# Patient Record
Sex: FEMALE | Race: WHITE | NOT HISPANIC OR LATINO | Employment: OTHER | URBAN - METROPOLITAN AREA
[De-identification: names, ages, dates, MRNs, and addresses within clinical notes are randomized per-mention and may not be internally consistent; named-entity substitution may affect disease eponyms.]

---

## 2024-05-02 ENCOUNTER — APPOINTMENT (OUTPATIENT)
Dept: ORTHOPEDIC SURGERY | Facility: CLINIC | Age: 74
End: 2024-05-02
Payer: MEDICARE

## 2024-05-09 ENCOUNTER — OFFICE VISIT (OUTPATIENT)
Dept: ORTHOPEDIC SURGERY | Facility: CLINIC | Age: 74
End: 2024-05-09
Payer: MEDICARE

## 2024-05-09 ENCOUNTER — HOSPITAL ENCOUNTER (OUTPATIENT)
Dept: RADIOLOGY | Facility: EXTERNAL LOCATION | Age: 74
Discharge: HOME | End: 2024-05-09

## 2024-05-09 DIAGNOSIS — M17.11 ARTHRITIS OF RIGHT KNEE: Primary | ICD-10-CM

## 2024-05-09 PROCEDURE — 20611 DRAIN/INJ JOINT/BURSA W/US: CPT | Performed by: STUDENT IN AN ORGANIZED HEALTH CARE EDUCATION/TRAINING PROGRAM

## 2024-05-09 PROCEDURE — 99214 OFFICE O/P EST MOD 30 MIN: CPT | Performed by: STUDENT IN AN ORGANIZED HEALTH CARE EDUCATION/TRAINING PROGRAM

## 2024-05-09 RX ORDER — LIDOCAINE HYDROCHLORIDE 10 MG/ML
2 INJECTION, SOLUTION EPIDURAL; INFILTRATION; INTRACAUDAL; PERINEURAL
Status: COMPLETED | OUTPATIENT
Start: 2024-05-09 | End: 2024-05-09

## 2024-05-09 RX ORDER — METHYLPREDNISOLONE ACETATE 40 MG/ML
40 INJECTION, SUSPENSION INTRA-ARTICULAR; INTRALESIONAL; INTRAMUSCULAR; SOFT TISSUE
Status: COMPLETED | OUTPATIENT
Start: 2024-05-09 | End: 2024-05-09

## 2024-05-09 RX ORDER — ROPIVACAINE HYDROCHLORIDE 5 MG/ML
3 INJECTION, SOLUTION EPIDURAL; INFILTRATION; PERINEURAL
Status: COMPLETED | OUTPATIENT
Start: 2024-05-09 | End: 2024-05-09

## 2024-05-09 RX ADMIN — LIDOCAINE HYDROCHLORIDE 2 ML: 10 INJECTION, SOLUTION EPIDURAL; INFILTRATION; INTRACAUDAL; PERINEURAL at 14:55

## 2024-05-09 RX ADMIN — ROPIVACAINE HYDROCHLORIDE 3 ML: 5 INJECTION, SOLUTION EPIDURAL; INFILTRATION; PERINEURAL at 14:55

## 2024-05-09 RX ADMIN — METHYLPREDNISOLONE ACETATE 40 MG: 40 INJECTION, SUSPENSION INTRA-ARTICULAR; INTRALESIONAL; INTRAMUSCULAR; SOFT TISSUE at 14:55

## 2024-05-09 NOTE — PROGRESS NOTES
REFERRAL SOURCE: No ref. provider found     CHIEF COMPLAINT: right knee pain    HISTORY OF PRESENT ILLNESS  Radha Suh is a very pleasant 74 y.o. female  with history of anxiety, hypothyroidism, hypertension, hyperlipidemia, vitamin D deficiency, GERD who presents with right knee pain. She is here today with her daughter Jessica.     Previous history:  1/23/23: Currently, she is complaining of moderate intermittent pain located primarily over the medial joint line and peripatellar. It is worse with stairs and any type of activity and improves with rest and Advil. She is also tried Tylenol and other NSAIDs in the past, but Advil seems to work the best. She will occasionally wear a neoprene knee sleeve brace. She has previously done physical therapy and is currently doing strengthening exercises with a  3 times a week and biking and walking for exercise. She is a previous patient of Dr. Talavera. In 2017 through 2020 she was getting Hyalgan there is a 5 viscosupplementation injections into the right knee. They were always lasting over 6 months. She is also previously had steroid injections, which she said were very helpful.     She underwent ultrasound-guided right knee corticosteroid injection on 1/23/2023.     4/21/2023: She continues to have moderate intermittent right knee pain, which is primarily located over the lateral joint line and anterior knee region. She continues to wear the neoprene knee brace, which helps. She got about 2 months of relief with the last corticosteroid injection. She continues to walk for exercise and works with a  2 days a week.     He underwent ultrasound-guided right knee joint corticosteroid injection on 4/21/2023.    Interval history:  5/9/24: She got about 9 months of relief with the right knee joint corticosteroid injection.  Pain is returning and is located primarily along the lateral joint line and anterior knee region.  She continues to  wear the neoprene knee sleeve.  She notes that she was doing well until after coming back from [place].  In [place], she was very active and walking on the beach.  She would like to have a repeat corticosteroid injection    MEDS  No current outpatient medications on file.    ALLERGIES  Not on File    PAST MEDICAL HISTORY  Past Medical History:   Diagnosis Date    Diverticulitis of intestine, part unspecified, without perforation or abscess without bleeding 08/26/2015    Diverticulitis    Diverticulosis of intestine, part unspecified, without perforation or abscess without bleeding 08/26/2015    Diverticulosis       PAST SURGICAL HISTORY  Past Surgical History:   Procedure Laterality Date    CARPAL TUNNEL RELEASE  05/10/2017    Neuroplasty Decompression Median Nerve At Carpal Tunnel    DILATION AND CURETTAGE OF UTERUS  08/12/2014    Dilation And Curettage    FOOT SURGERY  08/12/2014    Foot Repair    KNEE ARTHROSCOPY W/ DEBRIDEMENT  08/12/2014    Knee Arthroscopy (Therapeutic)    OTHER SURGICAL HISTORY  08/12/2014    Destruction Of Hemorrhoids    TUBAL LIGATION  08/12/2014    Tubal Ligation       SOCIAL HISTORY   Social History     Socioeconomic History    Marital status:      Spouse name: Not on file    Number of children: Not on file    Years of education: Not on file    Highest education level: Not on file   Occupational History    Not on file   Tobacco Use    Smoking status: Not on file    Smokeless tobacco: Not on file   Substance and Sexual Activity    Alcohol use: Not on file    Drug use: Not on file    Sexual activity: Not on file   Other Topics Concern    Not on file   Social History Narrative    Not on file     Social Determinants of Health     Financial Resource Strain: Low Risk  (9/6/2021)    Received from [facility]    Overall Financial Resource Strain (CARDIA)     Difficulty of Paying Living Expenses: Not hard at all   Food Insecurity: No Food Insecurity (9/6/2021)    Received from  [facility]    Hunger Vital Sign     Worried About Running Out of Food in the Last Year: Never true     Ran Out of Food in the Last Year: Never true   Transportation Needs: No Transportation Needs (9/6/2021)    Received from [facility]    PRAPARE - Transportation     Lack of Transportation (Medical): No     Lack of Transportation (Non-Medical): No   Physical Activity: Insufficiently Active (9/6/2021)    Received from [facility]    Exercise Vital Sign     Days of Exercise per Week: 2 days     Minutes of Exercise per Session: 20 min   Stress: No Stress Concern Present (9/6/2021)    Received from [facility]    [employer] - Occupational Stress Questionnaire     Feeling of Stress : Not at all   Social Connections: Moderately Integrated (9/6/2021)    Received from [facility]    Social Connection and Isolation Panel [NHANES]     Frequency of Communication with Friends and Family: More than three times a week     Frequency of Social Gatherings with Friends and Family: Once a week     Attends Gnosticist Services: More than 4 times per year     Active Member of Clubs or Organizations: Yes     Attends Club or Organization Meetings: 1 to 4 times per year     Marital Status:    Intimate Partner Violence: Not on file   Housing Stability: Low Risk  (6/29/2022)    Received from [facility]    Housing Stability Vital Sign     Unable to Pay for Housing in the Last Year: No     Number of Places Lived in the Last Year: 1     Unstable Housing in the Last Year: No       FAMILY HISTORY  No family history on file.    REVIEW OF SYSTEMS  Except for those mentioned in the history of present illness, and below, a complete review of systems is negative.     Review of Systems    VITALS  There were no vitals filed for this visit.    PHYSICAL EXAMINATION   GENERAL: Awake, alert, and oriented, no apparent distress, pleasant, and cooperative  PSYC: Mood is euthymic, affect is  congruent  EAR, NOSE, THROAT: Normocephalic, atraumatic, moist membranes, anicteric sclera  LUNG: Nonlabored breathing  HEART: No clubbing or cyanosis  SKIN: No increased erythema, warmth, rashes, or concerning skin lesions  NEURO: Sensation is intact in the bilateral lower extremities. Strength is grossly 5 out of 5 throughout the bilateral lower extremities, unless noted below.  GAIT: Non-antalgic  MUSCULOSKELETAL:  Examination of the right knee: Range of motion is 0-90.  Moderate effusion. No patellar apprehension. Tender to palpation of the medial and lateral joint lines and IT band as well as lateral patellar facet.    IMAGING STUDIES: Radiographs of the right knee dated 1/23/2023 - moderate-severe tricompartmental osteoarthritis, worse in the medial compartment and progressed since 8/3/2017.      IMPRESSION  #1  Acute exacerbation of chronic moderate-severe right knee osteoarthrosis    PLAN  The following was discussed with the patient:     Radha Suh is a very pleasant 74 y.o. female  with history of anxiety, hypothyroidism, hypertension, hyperlipidemia, vitamin D deficiency, GERD who presents with right knee pain due to acute exacerbation of chronic right knee osteoarthritis which has progressed radiographically since 2017.  -The diagnosis of knee arthritis was discussed in detail. The only cure for arthritis is a knee replacement. Without curing arthritis, we can improve the pain that the patient experiences and hopefully allow them to continue to do the activities that they enjoy.  -Physical therapy: Work on hip abductor, knee extensor, core strengthening and flexibility with your . She has done physical therapy in the past and is continuing some home exercises.  She was encouraged to continue these.  -Weight loss and physical activity: The benefits of weight loss and physical activity on improving pain experienced with arthritis were discussed. She was encouraged to continue to  remain physically active and stay of a healthy weight.  -Bracing: Continue to utilize neoprene knee sleeve as needed.  -Medications: Continue to take Advil and Tylenol over-the-counter as needed.  -Injections: Injections, such as corticosteroid, viscosupplementation can be utilized. The pros, cons, risks, benefits, pre-procedure and post-procedure protocols were discussed. She has previously gotten good relief with both steroid and viscosupplementation. After discussion, we elected to proceed with a repeat ultrasound-guided right knee joint aspiration and corticosteroid injection today.  We can consider viscosupplementation injections in the future.  -Follow-up in 3 months, or sooner if needed.    The patient was counseled to remain active, but avoid activities that worsen symptoms. The patient was in agreement with this plan. All questions were answered to the best of my ability.    PATIENT EDUCATION:  Education was discussed at today's appointment. A learning needs assessment was performed.    Primary learner: Radha Suh  Barriers to learning: None  Preferred language: English  Learning preferences include: Seeing and doing.  Discussed: Diagnosis and treatment plan.  Demonstrated: Understanding of material discussed.  Patient education materials given: None.  Learner response: Learner demonstrated understanding.    This note was dictated using Dragon speech recognition software and was not corrected for spelling or grammatical errors.    Large Jt Asp/Inj: R knee on 5/9/2024 2:55 PM  Details: ultrasound-guided  Medications: 40 mg methylPREDNISolone acetate 40 mg/mL; 2 mL lidocaine PF 10 mg/mL (1 %); 3 mL ropivacaine 5 mg/ml (0.5 %)    Pre-Procedure Diagnosis: right knee pain, right knee osteoarthritis and effusion   Post-Procedure Diagnosis: right knee pain, right knee osteoarthritis and effusion    Procedure: US-guided right knee aspiration and corticosteroid injection    History of Present Illness: Radha  NATE Suh is a pleasant 74 y.o. female with knee pain secondary to arthritis and effusion who is here for the above procedure for improved pain control.      Medications and allergies were reviewed with the patient. No contraindications were identified.    Informed Consent:   Following denial of allergy and review of potential side effects and complications including but not necessarily limited to infection, allergic reaction, local tissue breakdown, systemic effects of corticosteroids, elevation of blood glucose, injury to soft tissue and/or nerves and seizure, the patient indicated understanding and agreed to proceed. Written consent to treatment was obtained and the patient verbalized consent for the procedure.    Procedural Details  The use of direct ultrasound visualization of the needle (rather than a non-guided injection) was required to increase patient safety by excluding inadvertent intramuscular or intratendinous placement and minimizing bleeding by avoiding osteochondral or vascular injury from the needle.  Additionally, the increased accuracy of placement may increase clinical effectiveness and will allow higher diagnostic specificity when evaluating effectiveness of this injection.    Using ultrasound, a pre-scan of the region was performed to identify the target structure.     The area was prepped with chlorhexidine, then re-examined using the same transducer, a sterile ultrasound transducer cover, and sterile ultrasound gel.    Procedural pause conducted to verify:  correct patient identity, procedure to be performed, and as applicable, correct side and site, correct patient position, and availability of implants, special equipment, or special requirements    Procedure:   Transducer: Linear array transducer.  Patient position: Supine with the knee bent to 30 degrees.  Localization process: The suprapatellar recess was localized in a short axis view.  Local anesthesia: Local anesthesia was obtained  with 2 cc of 1% lidocaine.  Needle: A 27-gauge, 1.5-inch needle was used for local anesthesia and a 18-gauge, 1.5 inch needle was used for the aspiration and injectate.  Approach: A lateral to medial, in plane, approach was used to guide the needle tip into the suprapatellar recess deep to the quadriceps tendon and superficial to the prefemoral fat pad.   Injection/Aspiration: 16 cc of blood-tinged synovial fluid was aspirated. Thereafter, the syringes were switched and a mixture of 3 cc of 0.5% ropivocaine and 1 cc of DepoMedrol (40mg/mL) was injected into the right knee joint without complication.    Post-procedural care: The patient tolerated the procedure well and reported complete pain relief during the anesthetic phase. The patient was asked to ice for improved pain control and avoid submerging the area in water for the next 48 hours to help reduce the risk of infection. The patient was instructed to call the office immediately if there are any questions or concerns.     PATIENT EDUCATION:  Education of the diagnosis and treatment plan was discussed at today's appointment. A learning needs assessment was performed. The patient demonstrated understanding.

## 2024-11-08 ENCOUNTER — OFFICE VISIT (OUTPATIENT)
Dept: ORTHOPEDIC SURGERY | Facility: CLINIC | Age: 74
End: 2024-11-08
Payer: MEDICARE

## 2024-11-08 ENCOUNTER — APPOINTMENT (OUTPATIENT)
Dept: ORTHOPEDIC SURGERY | Facility: CLINIC | Age: 74
End: 2024-11-08
Payer: MEDICARE

## 2024-11-08 ENCOUNTER — HOSPITAL ENCOUNTER (OUTPATIENT)
Dept: RADIOLOGY | Facility: CLINIC | Age: 74
Discharge: HOME | End: 2024-11-08
Payer: MEDICARE

## 2024-11-08 VITALS — BODY MASS INDEX: 23.39 KG/M2 | WEIGHT: 149 LBS | HEIGHT: 67 IN

## 2024-11-08 DIAGNOSIS — G89.29 CHRONIC PAIN OF RIGHT KNEE: Primary | ICD-10-CM

## 2024-11-08 DIAGNOSIS — M25.561 CHRONIC PAIN OF RIGHT KNEE: Primary | ICD-10-CM

## 2024-11-08 DIAGNOSIS — G89.29 CHRONIC PAIN OF RIGHT KNEE: ICD-10-CM

## 2024-11-08 DIAGNOSIS — M25.561 CHRONIC PAIN OF RIGHT KNEE: ICD-10-CM

## 2024-11-08 PROCEDURE — 20610 DRAIN/INJ JOINT/BURSA W/O US: CPT | Mod: RT | Performed by: STUDENT IN AN ORGANIZED HEALTH CARE EDUCATION/TRAINING PROGRAM

## 2024-11-08 PROCEDURE — 99214 OFFICE O/P EST MOD 30 MIN: CPT | Performed by: STUDENT IN AN ORGANIZED HEALTH CARE EDUCATION/TRAINING PROGRAM

## 2024-11-08 PROCEDURE — 99204 OFFICE O/P NEW MOD 45 MIN: CPT | Performed by: STUDENT IN AN ORGANIZED HEALTH CARE EDUCATION/TRAINING PROGRAM

## 2024-11-08 PROCEDURE — 73564 X-RAY EXAM KNEE 4 OR MORE: CPT | Mod: RT

## 2024-11-08 PROCEDURE — 2500000004 HC RX 250 GENERAL PHARMACY W/ HCPCS (ALT 636 FOR OP/ED): Performed by: STUDENT IN AN ORGANIZED HEALTH CARE EDUCATION/TRAINING PROGRAM

## 2024-11-08 RX ORDER — DONEPEZIL HYDROCHLORIDE 10 MG/1
TABLET, FILM COATED ORAL
COMMUNITY

## 2024-11-08 RX ORDER — DICYCLOMINE HYDROCHLORIDE 10 MG/1
10 CAPSULE ORAL
COMMUNITY
Start: 2023-07-18

## 2024-11-08 RX ORDER — LIDOCAINE HYDROCHLORIDE 10 MG/ML
4 INJECTION, SOLUTION INFILTRATION; PERINEURAL
Status: COMPLETED | OUTPATIENT
Start: 2024-11-08 | End: 2024-11-08

## 2024-11-08 RX ORDER — CELECOXIB 200 MG/1
CAPSULE ORAL
COMMUNITY
Start: 2019-01-11

## 2024-11-08 RX ORDER — FLUTICASONE PROPIONATE 50 MCG
1 SPRAY, SUSPENSION (ML) NASAL DAILY
COMMUNITY
Start: 2014-10-30

## 2024-11-08 RX ORDER — ATORVASTATIN CALCIUM 20 MG/1
20 TABLET, FILM COATED ORAL DAILY
COMMUNITY

## 2024-11-08 RX ORDER — CYCLOBENZAPRINE HCL 10 MG
TABLET ORAL
COMMUNITY
Start: 2018-12-18

## 2024-11-08 RX ORDER — ACETAMINOPHEN 500 MG
TABLET ORAL
COMMUNITY

## 2024-11-08 RX ORDER — ASPIRIN 81 MG/1
TABLET ORAL
COMMUNITY

## 2024-11-08 RX ORDER — TRIAMCINOLONE ACETONIDE 40 MG/ML
40 INJECTION, SUSPENSION INTRA-ARTICULAR; INTRAMUSCULAR
Status: COMPLETED | OUTPATIENT
Start: 2024-11-08 | End: 2024-11-08

## 2024-11-08 RX ORDER — ALBUTEROL SULFATE 90 UG/1
INHALANT RESPIRATORY (INHALATION)
COMMUNITY

## 2024-11-08 RX ORDER — BENZONATATE 200 MG/1
CAPSULE ORAL
COMMUNITY
Start: 2017-12-20

## 2024-11-08 RX ORDER — CLONAZEPAM 0.5 MG/1
0.5 TABLET, ORALLY DISINTEGRATING ORAL 2 TIMES DAILY PRN
COMMUNITY

## 2024-11-08 ASSESSMENT — PAIN - FUNCTIONAL ASSESSMENT: PAIN_FUNCTIONAL_ASSESSMENT: 0-10

## 2024-11-08 ASSESSMENT — PAIN DESCRIPTION - DESCRIPTORS: DESCRIPTORS: ACHING

## 2024-11-08 ASSESSMENT — PAIN SCALES - GENERAL: PAINLEVEL_OUTOF10: 5 - MODERATE PAIN

## 2024-11-08 NOTE — PROGRESS NOTES
ROBERT/TKA Related Summary           L hip: N  L knee  Years ago: Arthroscopic surgery, presumably debridement  R hip: N  R knee  Years ago: Arthroscopic surgery, presumably debridement  Lumbar surgery or significant pathology: N            CC/SUBJECTIVE/HPI            PCP: Paolo Long MD  Referring provider: No ref. provider found  Radha Suh is a 74 y.o. female presenting for R knee pain.  This pain has been going on for several years.  She has been following with my colleague, Dr. Eugene, and is seeing me today since Dr. Eugene has moved her practice.  She has tried a variety of conservative treatments listed below.  At this point, the pain is limiting activities of daily living and hobbies.    R knee  Symptoms  Pain: 5/10  Onset: chronic/gradual  Duration: >2yrs  Location: inner knee and outer knee  Quality: catch/lock  Limitations: morning stiffness, pain after activity, limp, night pain, difficulty with stairs, and difficulty in/out of chair/car  Ambulation limit due to pain: >500ft but hurts  Other symptoms: none  Treatment  Tried: activity modification, bracing, PT, home exercises, OTCs, corticosteroid injection(s), and HA injection(s)  Most recent injection <3mo ago? N (5/9/2024)  Assistive device: none  Treatment attempted for >2yrs and is partially effective            HISTORIES (System Generated and Pt Reported on Form Today)       Dental  Pt reported: No active issues     PMH  Pt reported: Denies heart/lung/kidney/liver issues, DM, stroke, seizure, bariatric surgery, anticoag, MRSA, cancer, personal/familial coagulopathies except: none in addition to below  System generated (PMH, problem list both included since EMR change caused discrepancies):   Past Medical History:   Diagnosis Date    Diverticulitis of intestine, part unspecified, without perforation or abscess without bleeding 08/26/2015    Diverticulitis    Diverticulosis of intestine, part unspecified, without perforation or  "abscess without bleeding 08/26/2015    Diverticulosis    There is no problem list on file for this patient.    PSH  Pt reported: Per above.   System generated:   Past Surgical History:   Procedure Laterality Date    CARPAL TUNNEL RELEASE  05/10/2017    Neuroplasty Decompression Median Nerve At Carpal Tunnel    DILATION AND CURETTAGE OF UTERUS  08/12/2014    Dilation And Curettage    FOOT SURGERY  08/12/2014    Foot Repair    KNEE ARTHROSCOPY W/ DEBRIDEMENT  08/12/2014    Knee Arthroscopy (Therapeutic)    OTHER SURGICAL HISTORY  08/12/2014    Destruction Of Hemorrhoids    TUBAL LIGATION  08/12/2014    Tubal Ligation     Family Hx  Pt reported clot/coagulopathies: none    Social Hx  Pt reported substance use: EtOH (3 drink(s)/wk)  System generated:      Allergies  Pt reported (meds, metals): per below  System generated:   Allergies   Allergen Reactions    Levofloxacin Anaphylaxis, Itching and Rash    Rash Away Rash     sinus drainage     Current Meds  System generated: No current outpatient medications on file.    ROS: Neg except HPI            OBJECTIVE            Physical exam  Estimated body mass index is 23.34 kg/m² as calculated from the following:    Height as of this encounter: 1.702 m (5' 7\").    Weight as of this encounter: 67.6 kg (149 lb).  Gen/psych: NAD, conversational, appropriate    Ambulation  Gait: antalgic  Assistive device: none  Spine  Lumbar tenderness: neg  Limited ROM: neg, with no radiation or pain with flex/ex, lateral bending  SLR test: neg    Focused MSK exam: R  Knee  Thrust: neg  Skin/incision: normal in area of planned/possible incision (medial parapatellar approach)  Effusion: mild  Alignment: neutral  Alignment correctable: na  Knee tenderness: diffuse  Pes or Gerdy's tenderness: neg  Crepitation: moderate  Extension: passive flexion contracture 5-10° and active extension lag 0°  Flexion: 90°  Anterior drawer: stable  Posterior drawer: stable  Varus/Valgus in extension: " "stable  Varus/Valgus in flexion: stable  Referred pain to knee with hip 90° flexion and 45° ER/IR: neg  Neurovascular    Strength: 5/5 hip/knee/ankle flexion and extension  Sensory (L2-S1): SILT throughout lower extremity  Edema/stasis: no pitting edema  Pulse: DP 1+, PT 1+     Imaging  11/8/2024 R knee radiographs ([facility], WB AP/lateral, WB AP flexion) on my read: Joint space narrowing (medial tibiofemoral moderate, lateral tibiofemoral severe, patellofemoral moderate) with osteophytes and sclerosis. Limb alignment neutral.            ASSESSMENT & PLAN           Patient verbalized understanding of below A&P. All questions answered.  R knee pain  Differential includes radicular pain from spine or referred pain from hip. H&P most consistent with knee origin.  General information  Evaluation, imaging, diagnosis, and treatment options (conservative and surgical as well as risks, benefits, recovery, and outcomes) discussed. Conservative options should be maximized and include activity modification, bracing, weight loss, PT, home exercise, local pain control (ice, heat, topicals), OTCs, and assistive devices. Once exhausted, injections may provide relief. If these fail to improve pain, function, and quality of life, elective arthroplasty may be an option.  Shared decision making   Patient elected for corticosteroid injection, which was provided.  Follow-up: As needed  PMH, PSH, other considerations discussed  \"Moderate\" Alzheimer's, lives with daughter and her family, accompanied by daughter today  No other major relevant medical issues   Occupation: Homemaker  Hobbies: Spending time with family and friends, reading, crossword's        L Inj/Asp: R knee on 11/8/2024 11:12 AM  Indications: pain  Details: 22 G needle, anterolateral approach  Medications: 40 mg triamcinolone acetonide 40 mg/mL; 4 mL lidocaine 10 mg/mL (1 %)  Outcome: tolerated well, no immediate complications  Procedure, treatment alternatives, risks and " benefits explained, specific risks discussed. Consent was given by the patient. Immediately prior to procedure a time out was called to verify the correct patient, procedure, equipment, support staff and site/side marked as required. Patient was prepped and draped in the usual sterile fashion.